# Patient Record
Sex: FEMALE | Race: WHITE | NOT HISPANIC OR LATINO | Employment: FULL TIME | URBAN - METROPOLITAN AREA
[De-identification: names, ages, dates, MRNs, and addresses within clinical notes are randomized per-mention and may not be internally consistent; named-entity substitution may affect disease eponyms.]

---

## 2021-01-07 ENCOUNTER — OFFICE VISIT (OUTPATIENT)
Dept: BARIATRICS | Facility: CLINIC | Age: 51
End: 2021-01-07
Payer: COMMERCIAL

## 2021-01-07 VITALS
SYSTOLIC BLOOD PRESSURE: 118 MMHG | HEART RATE: 88 BPM | DIASTOLIC BLOOD PRESSURE: 70 MMHG | WEIGHT: 188.1 LBS | TEMPERATURE: 98.6 F | BODY MASS INDEX: 29.52 KG/M2 | HEIGHT: 67 IN

## 2021-01-07 DIAGNOSIS — F41.8 DEPRESSION WITH ANXIETY: ICD-10-CM

## 2021-01-07 DIAGNOSIS — E66.3 OVERWEIGHT: Primary | ICD-10-CM

## 2021-01-07 DIAGNOSIS — E78.5 HYPERLIPIDEMIA: ICD-10-CM

## 2021-01-07 DIAGNOSIS — K21.9 GERD (GASTROESOPHAGEAL REFLUX DISEASE): ICD-10-CM

## 2021-01-07 PROCEDURE — 99244 OFF/OP CNSLTJ NEW/EST MOD 40: CPT | Performed by: INTERNAL MEDICINE

## 2021-01-07 RX ORDER — OMEPRAZOLE 40 MG/1
40 CAPSULE, DELAYED RELEASE ORAL 2 TIMES DAILY
COMMUNITY
Start: 2021-01-04

## 2021-01-07 RX ORDER — ATORVASTATIN CALCIUM 20 MG/1
20 TABLET, FILM COATED ORAL DAILY
COMMUNITY
Start: 2020-11-23 | End: 2021-11-23

## 2021-01-07 RX ORDER — TOPIRAMATE 50 MG/1
100 TABLET, FILM COATED ORAL DAILY
COMMUNITY
Start: 2020-11-06 | End: 2021-01-07

## 2021-01-07 RX ORDER — ALBUTEROL SULFATE 90 UG/1
1-2 AEROSOL, METERED RESPIRATORY (INHALATION)
COMMUNITY
Start: 2014-06-16

## 2021-01-07 RX ORDER — IBUPROFEN 800 MG/1
TABLET ORAL
COMMUNITY

## 2021-01-07 RX ORDER — BUSPIRONE HYDROCHLORIDE 10 MG/1
10 TABLET ORAL 2 TIMES DAILY
COMMUNITY
Start: 2020-11-19 | End: 2021-01-07

## 2021-01-07 NOTE — PROGRESS NOTES
Assessment/Plan:  Zenaida Robin was seen today for consult  Diagnoses and all orders for this visit:      Hyperlipidemia  Can improve with weight loss  On lipitor    Depression with anxiety  Currently stable after going back on sertraline  Did not tolerate Wellbutrin due to increased anxiety    GERD (gastroesophageal reflux disease)  Controlled on Prilosec    Overweight  -     Basic metabolic panel; Future  -     Magnesium; Future  - Discussed options of HealthyCORE-Intensive Lifestyle Intervention Program, Very Low Calorie Diet-VLCD and Conservative Program and the role of weight loss medications  - Explained the importance of making lifestyle changes first before starting any anti-obesity medications  Patient should demonstrate lifestyle changes first before anti-obesity medication can be initiated  - Patient is interested in pursuing Very Low Calorie Diet-VLCD then Healthy Core/BH  - Initial weight loss goal of 5-10% weight loss for improved health  - Screening labs    Goals:  Do not skip any meals! Food log (ie ) www myfitnesspal com,sparkpeople  com,loseit com,calorieking  com,etc  baritastic (use smartphone [employer] for recipes)  No sugary beverages  At least 64oz of water daily  Increase physical activity by 10 minutes daily  Gradually increase physical activity to a goal of 5 days per week for 30 minutes of MODERATE intensity PLUS 2 days per week of FULL BODY resistance training (use smartphone apps FitON, Home Workout, etc )  Goal protein 90g per day  Goal carbohydrates 90g per day   [id number]    45 minute visit, >50% face-to-face time spent counseling patient on surgical and nonsurgical interventions for the treatment of excess weight  Discussed the advantages and long-term outcomes with regards to bariatric surgery    Discussed in detail nonsurgical options including intensive lifestyle intervention program, very low-calorie diet program and conservative program   Discussed the role of weight loss medications  Counseled patient on diet behavior and exercise modification for weight loss  Follow up in approximately 2 weeks with Non-Surgical Dietician  Subjective:   Chief Complaint   Patient presents with    Consult     Patient is here for initial MWM consult with Dr Abiel Damian  BMI 29 46  Negative stop bang (0/8)  Patient ID: Babar Warren  is a 53 y o  female with excess weight/obesity here to pursue weight management  Past Medical History:   Diagnosis Date    Acid reflux     Anxiety     Depression     Hyperlipidemia     Left bundle branch block     Overweight (BMI 25 0-29  9)      Past Surgical History:   Procedure Laterality Date    CHOLECYSTECTOMY      TUBAL LIGATION         HPI:  Wt Readings from Last 20 Encounters:   01/07/21 85 3 kg (188 lb 1 6 oz)   06/16/14 78 9 kg (174 lb)       Obesity/Excess Weight: 30 lb  Severity: overweight  Onset: +40 lbs over last 10 years   When quit smoking and went through menopause   Modifiers: Diet and Exercise  Contributing factors: Poor Food Choices and Insufficient Physical Activity  Associated symptoms: depression  Goal weight: 140  Saw MWM at [facility]; did not like the care she got there  Was taken off sertraline and started on Wellbutrin and topamax-  Had side effects with tingling/numbness, and anxiety got worse  Back on sertraline and feeling better    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 for 3 weeks but gained weight 3 lbs; Goal 23 points but not reaching it  For fruit just eating blueberries/blackberries  Very frustrated that she cannot lose weight  Has been taking Nyquil due to cold sx's for the past week    Gets nausea depending on food   Has GI appt coming up  Doesn't want to consider weight loss medications at this point    B- hard boiled egg, blackberries  S- built bar   L- spinach soup  S- blackberries  D- protein, veggies  S-    Hydration: >64 oz water  Alcohol: no  Smoking: no  Exercise: 3 days a week fitness training   Sleep: 7 hours    The following portions of the patient's history were reviewed and updated as appropriate: allergies, current medications, past family history, past medical history, past social history, past surgical history, and problem list     Review of Systems   Constitutional: Negative for appetite change, chills and fever  HENT: Positive for congestion and rhinorrhea  Negative for sore throat  Respiratory: Negative for cough, chest tightness and shortness of breath  Cardiovascular: Negative for chest pain and leg swelling  Gastrointestinal: Negative for abdominal pain, constipation, diarrhea, nausea and vomiting  Endocrine: Negative for cold intolerance and heat intolerance  Genitourinary: Negative for difficulty urinating  Musculoskeletal: Negative for arthralgias  Skin: Negative for color change  Neurological: Negative for dizziness, numbness and headaches  Psychiatric/Behavioral: Negative for sleep disturbance  The patient is not nervous/anxious  All other systems reviewed and are negative  Objective:  /70 (BP Location: Right arm, Patient Position: Sitting, Cuff Size: Large)   Pulse 88   Temp 98 6 °F (37 °C) (Temporal)   Ht 5' 7" (1 702 m)   Wt 85 3 kg (188 lb 1 6 oz)   BMI 29 46 kg/m²   Constitutional: Well-developed, well-nourished and obese Body mass index is 29 46 kg/m²  Luke Rodriguez HEENT: No conjunctival pallor or jaundice  Pulmonary: No increased work of breathing or signs of respiratory distress  Clear to auscultation  CV: Normal rate and rhythm, S1 and S2, without murmurs  GI: Obese  Normal bowel sounds  Soft and nontender  MSK: No edema   Neuro: Oriented to person, place and time  Normal Speech  Normal gait  Psych: Normal affect and mood       Labs and Imaging  Lab Results   Component Value Date    WBC 7 12 05/21/2014    HGB 13 8 05/21/2014    HCT 42 4 05/21/2014    MCV 91 05/21/2014     05/21/2014     Lab Results   Component Value Date     (L) 05/21/2014    K 3 6 05/21/2014    CL 99 (L) 05/21/2014    CO2 26 05/21/2014    ANIONGAP 10 05/21/2014    BUN 19 05/21/2014    CREATININE 0 65 05/21/2014    GLUF 88 07/31/2014    CALCIUM 9 5 05/21/2014    AST 12 05/21/2014    ALT 21 05/21/2014    ALKPHOS 75 05/21/2014    PROT 8 7 (H) 05/21/2014    BILITOT 0 33 05/21/2014     No results found for: HGBA1C  Lab Results   Component Value Date    LDK5KCKDFEIY 2 077 05/21/2014     No results found for: CHOLESTEROL  Lab Results   Component Value Date    HDL 44 07/31/2014     Lab Results   Component Value Date    TRIG 113 07/31/2014     No results found for: LDLDIRECT        Colonoscopy-Not applicable

## 2021-01-11 ENCOUNTER — OFFICE VISIT (OUTPATIENT)
Dept: BARIATRICS | Facility: CLINIC | Age: 51
End: 2021-01-11

## 2021-01-11 VITALS — WEIGHT: 185.6 LBS | TEMPERATURE: 97.5 F | BODY MASS INDEX: 29.13 KG/M2 | HEIGHT: 67 IN

## 2021-01-11 DIAGNOSIS — R63.5 ABNORMAL WEIGHT GAIN: ICD-10-CM

## 2021-01-11 PROCEDURE — RECHECK

## 2021-01-11 PROCEDURE — VLCD

## 2021-01-13 ENCOUNTER — TELEPHONE (OUTPATIENT)
Dept: BARIATRICS | Facility: CLINIC | Age: 51
End: 2021-01-13

## 2021-01-14 ENCOUNTER — TELEPHONE (OUTPATIENT)
Dept: BARIATRICS | Facility: CLINIC | Age: 51
End: 2021-01-14

## 2021-01-14 NOTE — TELEPHONE ENCOUNTER
Called pt to check after she started VLCD  She reports that she is doing well and is having no issues so far  She does have to have a stomach emptying test done next Monday    She was concerned because she needs to eat eggs/toast   She was also told that she could have an Ensure if she did not want the

## 2021-01-25 ENCOUNTER — OFFICE VISIT (OUTPATIENT)
Dept: BARIATRICS | Facility: CLINIC | Age: 51
End: 2021-01-25

## 2021-01-25 VITALS — BODY MASS INDEX: 27.97 KG/M2 | TEMPERATURE: 98 F | HEIGHT: 67 IN | WEIGHT: 178.2 LBS

## 2021-01-25 DIAGNOSIS — R63.5 ABNORMAL WEIGHT GAIN: Primary | ICD-10-CM

## 2021-01-25 PROCEDURE — RECHECK

## 2021-01-25 PROCEDURE — VLCD

## 2021-01-25 NOTE — PROGRESS NOTES
Weight Management Medical Nutrition Assessment  Em Banerjee was here today for her 2 wk VLCD follow-up visit  Today she weighs 178 2 lbs giving her a loss of 7 4 lbs in the last 2 weeks  She reports she is tolerating product with out difficultly and is no longer hungry now that she is in ketosis  She did state that she has been "emotional over not eating real food "  We discussed having a visit with our Doctor Ollie Dumont  She is interested and I provided her with the questionnaire to fill out  Questions asked and answered  She plans to continue at this time  Labs ordered  Patient seen by Medical Provider in past 6 months:  yes ( she saw Dr Sandy Arriaga for her initial visit )  Requested to schedule appointment with Medical Provider: No      Anthropometric Measurements  Start Weight (#): 185 6 lbs  Current Weight (#): 178 2 lbs  TBW % Change from start weight:   Ideal Body Weight (#):135 lbs  Goal Weight (#): 150    Weight Loss History  Previous weight loss attempts: Commercial Programs ([employer], [employer], etc )  Counseling with  MD    Food and Nutrition Related History  Wake up: 5:30 am   Bed Time: 11 pm    Food Recall  Breakfast:shake   Snack:  Lunch: shake/pudding  Snack: bar  Dinner: shake  Snack:       Beverages: water  Volume of beverage intake: 80 +     Weekends: Same  Cravings: sweets  Trouble area of day: after dinner/early evening  Frequency of Eating out: irregularly  Food restrictions:none  Cooking: self   Food Shopping: self    Physical Activity Intake  Activity:Walking and Aerobics  Frequency:occasionally  Physical limitations/barriers to exercise: none    Estimated Needs  Energy    [employer] Needs: BMR : 2359   1-2# loss weekly sedentary:  759 - 1259         1-2# loss weekly lightly active: 1016 - 1516   Maintenance calories for sedentary activity level: 1759  Protein: 74 - 92     (1 2-1 5g/kg IBW)  Fluid: 72   (35mL/kg IBW)    Nutrition Diagnosis  Yes;     Overweight/obesity  related to Excess energy intake as evidenced by  BMI more than normative standard for age and sex (overweight 25-29  9)       Nutrition Intervention    Nutrition Prescription  Calories:760   Protein: 96  Fluid: 80    Meal Plan (Jaime/Pro/Carb)  Breakfast:  200/27/10   Meal replacement  Snack:  Lunch: 200/27/10   Meal replacement  Snack:  Dinner: 200/27/10   Meal replacement  Snack: 160/15/13  Bar    Nutrition Education:    Calorie controlled menu  Lean protein food choices  Healthy snack options  Food journaling tips      Nutrition Counseling:  Strategies: meal planning, portion sizes, healthy snack choices, hydration, fiber intake, protein intake, exercise, food journal      Monitoring and Evaluation:  Evaluation criteria:  Energy Intake  Meet protein needs  Maintain adequate hydration  Monitor weekly weight  Meal planning/preparation  Food journal   Decreased portions at mealtimes and snacks  Physical activity     Barriers to learning:none  Readiness to change: Action:  (Changing behavior)  Comprehension: good  Expected Compliance: good

## 2021-02-01 ENCOUNTER — TELEPHONE (OUTPATIENT)
Dept: BARIATRICS | Facility: CLINIC | Age: 51
End: 2021-02-01

## 2021-02-01 NOTE — TELEPHONE ENCOUNTER
Lyric emailed to have me call her back - she has a VLCD questions  Left her a message  Waiting for her to return my call

## 2021-02-03 ENCOUNTER — TELEPHONE (OUTPATIENT)
Dept: BARIATRICS | Facility: CLINIC | Age: 51
End: 2021-02-03

## 2021-02-03 NOTE — TELEPHONE ENCOUNTER
Returned patient's call - she had left me a message this morning  She had questions about adding exercise into her plan  Reviewed the guidelines and reiterated the importance of adding a snack back in  Also discussed the importance of meeting her hydration goals  Questions asked and answered

## 2021-02-08 ENCOUNTER — OFFICE VISIT (OUTPATIENT)
Dept: BARIATRICS | Facility: CLINIC | Age: 51
End: 2021-02-08

## 2021-02-08 VITALS — HEIGHT: 67 IN | BODY MASS INDEX: 27.5 KG/M2 | WEIGHT: 175.2 LBS

## 2021-02-08 DIAGNOSIS — R63.5 ABNORMAL WEIGHT GAIN: ICD-10-CM

## 2021-02-08 PROCEDURE — VLCD

## 2021-02-08 PROCEDURE — RECHECK

## 2021-02-08 NOTE — PROGRESS NOTES
Weight Management Medical Nutrition Assessment  May Metzger was here today for her 4 wk VLCD follow-up visit  Today she weighs 173 2 lbs giving her a loss of 5 lbs in the last 2 weeks  She reports she is tolerating product with out difficultly and is no longer hungry now that she is in ketosis  She reports that she no longer has "emotional issues because of not eating real food" and is no longer interested in speaking with our   She reports that she has been exercising and was feeling tired and a little light headed  Reviewed the guidelines for exercise while on VLCD and reviewed the snacks to add with her  Questions asked and answered  She will be continuing at this time  Patient seen by Medical Provider in past 6 months:  yes ( she saw Dr Jennifer Lobo for her initial visit )  Requested to schedule appointment with Medical Provider: No        Anthropometric Measurements  Start Weight (#): 185 6 lbs  Current Weight (#): 173 2 lbs  TBW % Change from start weight:   Ideal Body Weight (#):135 lbs  Goal Weight (#): 150     Weight Loss History  Previous weight loss attempts: Commercial Programs ([employer], [employer], etc )  Counseling with  MD     Food and Nutrition Related History  Wake up: 5:30 am         Bed Time: 11 pm     Food Recall  Breakfast:shake            Snack:  Lunch: shake/pudding  Snack: bar  Dinner: shake  Snack:         Beverages: water  Volume of beverage intake: 80 +      Weekends: Same  Cravings: sweets  Trouble area of day: after dinner/early evening      Frequency of Eating out: irregularly  Food restrictions:none  Cooking: self   Food Shopping: self     Physical Activity Intake  Activity:Walking and Aerobics  Frequency:occasionally  Physical limitations/barriers to exercise: none     Estimated Needs  Energy     [employer] Energy Needs:  BMR : 6807   1-2# loss weekly sedentary:  750 - 1259         1-2# loss weekly lightly active: 1880 - 1516   Maintenance calories for sedentary activity level: 1759  Protein: 74 - 92     (1 2-1 5g/kg IBW)  Fluid: 72   (35mL/kg IBW)     Nutrition Diagnosis  Yes; Overweight/obesity  related to Excess energy intake as evidenced by  BMI more than normative standard for age and sex (overweight 25-29  9)     Nutrition Intervention     Nutrition Prescription  Calories:760   Protein: 96  Fluid: 80     Meal Plan (Jaime/Pro/Carb)  Breakfast:  200/27/10   Meal replacement  Snack:  Lunch: 200/27/10   Meal replacement  Snack:  Dinner: 200/27/10   Meal replacement  Snack: 160/15/13  Bar     Nutrition Education:    Calorie controlled menu  Lean protein food choices  Healthy snack options  Food journaling tips        Nutrition Counseling:  Strategies: meal planning, portion sizes, healthy snack choices, hydration, fiber intake, protein intake, exercise, food journal        Monitoring and Evaluation:  Evaluation criteria:  Energy Intake  Meet protein needs  Maintain adequate hydration  Monitor weekly weight  Meal planning/preparation  Food journal   Decreased portions at mealtimes and snacks  Physical activity      Barriers to learning:none  Readiness to change: Action:  (Changing behavior)  Comprehension: good  Expected Compliance: good

## 2021-02-22 ENCOUNTER — OFFICE VISIT (OUTPATIENT)
Dept: BARIATRICS | Facility: CLINIC | Age: 51
End: 2021-02-22

## 2021-02-22 VITALS — HEIGHT: 67 IN | WEIGHT: 170 LBS | BODY MASS INDEX: 26.68 KG/M2

## 2021-02-22 DIAGNOSIS — R63.5 ABNORMAL WEIGHT GAIN: Primary | ICD-10-CM

## 2021-02-22 PROCEDURE — VLCD

## 2021-02-22 PROCEDURE — RECHECK

## 2021-02-22 NOTE — PROGRESS NOTES
Weight Management Medical Nutrition Assessment  Fabio Odonnell was here today for her 6 wk VLCD follow-up visit   Today she weighs 170 0 lbs giving her a loss of 3 2 lbs in the last 2 weeks   She reports she is tolerating product with out difficultly and has no constipation  She reports again that she has been exercising and was feeling tired and a little light headed  Reiterated the guidelines for exercise while on VLCD and reviewed the snacks to add with her  Questions asked and answered  Labs ordered  She will be continuing at this time       Patient seen by Medical Provider in past 6 months:  yes ( she saw Dr Shauna Sinclair for her initial visit )  Requested to schedule appointment with Medical Provider: No        Anthropometric Measurements  Start Weight (#): 185 6 lbs  Current Weight (#): 170 0 lbs  TBW % Change from start weight: 8%  Ideal Body Weight (#):135 lbs  Goal Weight (#): 150     Weight Loss History  Previous weight loss attempts: Commercial Programs (Weight Watchers, [employer]ena Rival, etc )  Counseling with  MD     Food and Nutrition Related History  Wake up: 5:30 am         Bed Time: 11 pm     Food Recall  Breakfast:shake            Snack:  Lunch: shake/pudding  Snack: bar  Dinner: shake  Snack:         Beverages: water  Volume of beverage intake: 80 +      Weekends: Same  Cravings: sweets  Trouble area of day: after dinner/early evening      Frequency of Eating out: irregularly  Food restrictions:none  Cooking: self   Food Shopping: self     Physical Activity Intake  Activity:Walking and Aerobics  Frequency:occasionally  Physical limitations/barriers to exercise: none     Estimated Needs  Energy     [employer] Energy Needs:  BMR : 6231   3-7# loss weekly sedentary:  714 - 4500         4-3# loss weekly lightly active: 1016 - 1516   Maintenance calories for sedentary activity level: 1759  Protein: 74 - 92     (1 2-1 5g/kg IBW)  Fluid: 72   (35mL/kg IBW)     Nutrition Diagnosis  Yes;    Overweight/obesity  related to Excess energy intake as evidenced by  BMI more than normative standard for age and sex (overweight 25-29  9)     Nutrition Intervention     Nutrition Prescription  Calories:760   Protein: 96  Fluid: 80     Meal Plan (Jaime/Pro/Carb)  Breakfast:  200/27/10   Meal replacement  Snack:  Lunch: 200/27/10   Meal replacement  Snack:  Dinner: 200/27/10   Meal replacement  Snack: 160/15/13  Bar     Nutrition Education:    Calorie controlled menu  Lean protein food choices  Healthy snack options  Food journaling tips        Nutrition Counseling:  Strategies: meal planning, portion sizes, healthy snack choices, hydration, fiber intake, protein intake, exercise, food journal        Monitoring and Evaluation:  Evaluation criteria:  Energy Intake  Meet protein needs  Maintain adequate hydration  Monitor weekly weight  Meal planning/preparation  Food journal   Decreased portions at mealtimes and snacks  Physical activity      Barriers to learning:none  Readiness to change: Action:  (Changing behavior)  Comprehension: good  Expected Compliance: good

## 2021-03-08 ENCOUNTER — OFFICE VISIT (OUTPATIENT)
Dept: BARIATRICS | Facility: CLINIC | Age: 51
End: 2021-03-08

## 2021-03-08 DIAGNOSIS — R63.5 ABNORMAL WEIGHT GAIN: ICD-10-CM

## 2021-03-08 PROCEDURE — VLCD

## 2021-03-08 PROCEDURE — RECHECK

## 2021-03-08 NOTE — PROGRESS NOTES
Weight Management Medical Nutrition Assessment  Nissa Hernandez was here today for her 8 wk VLCD follow-up visit   Today she weighs 165 4 lbs giving her a loss of 4 6 lbs in the last 2 weeks and a total of 22 7 since started the program   She continues to tolerate product with out difficultly and has no constipation   She continues to exercise, but reports that she is adding the appropriate snacks in per our previous conversation  She will be continuing at this time       Patient seen by Medical Provider in past 6 months:  yes ( she saw Dr Dennise Reyes for her initial visit )  Requested to schedule appointment with Medical Provider: No        Anthropometric Measurements  Start Weight (#): 188 1 lbs  Current Weight (#): 165 4 lbs  TBW % Change from start weight: 12 %  Ideal Body Weight (#):135 lbs  Goal Weight (#): 150     Weight Loss History  Previous weight loss attempts: Commercial Programs (Weight Watchers, [employer], etc )  Counseling with  MD     Food and Nutrition Related History  Wake up: 5:30 am         Bed Time: 11 pm     Food Recall  Breakfast:shake            Snack:  Lunch: shake/pudding  Snack: bar  Dinner: shake  Snack:         Beverages: water  Volume of beverage intake: 80 +      Weekends: Same  Cravings: sweets  Trouble area of day: after dinner/early evening      Frequency of Eating out: irregularly  Food restrictions:none  Cooking: self   Food Shopping: self     Physical Activity Intake  Activity:Walking and Aerobics  Frequency:occasionally  Physical limitations/barriers to exercise: none     Estimated Needs  Energy     [employer] [place] Energy Needs:  BMR : 8811   0# loss weekly sedentary:  5686         9-6# loss weekly lightly active: 936 - 1436   Maintenance calories for sedentary activity level: 1689  Protein: 74 - 92     (1 2-1 5g/kg IBW)  Fluid: 72   (35mL/kg IBW)     Nutrition Diagnosis  Yes;    Overweight/obesity  related to Excess energy intake as evidenced by  BMI more than normative standard for age and sex (overweight 25-29  9)     Nutrition Intervention     Nutrition Prescription  Calories:760   Protein: 96  Fluid: 80     Meal Plan (Jaime/Pro/Carb)  Breakfast:  200/27/10   Meal replacement  Snack:  Lunch: 200/27/10   Meal replacement  Snack:  Dinner: 200/27/10   Meal replacement  Snack: 160/15/13  Bar     Nutrition Education:    Calorie controlled menu  Lean protein food choices  Healthy snack options  Food journaling tips        Nutrition Counseling:  Strategies: meal planning, portion sizes, healthy snack choices, hydration, fiber intake, protein intake, exercise, food journal        Monitoring and Evaluation:  Evaluation criteria:  Energy Intake  Meet protein needs  Maintain adequate hydration  Monitor weekly weight  Meal planning/preparation  Food journal   Decreased portions at mealtimes and snacks  Physical activity      Barriers to learning:none  Readiness to change: Action:  (Changing behavior)  Comprehension: good  Expected Compliance: good

## 2021-03-16 ENCOUNTER — TELEPHONE (OUTPATIENT)
Dept: BARIATRICS | Facility: CLINIC | Age: 51
End: 2021-03-16

## 2021-03-16 NOTE — TELEPHONE ENCOUNTER
Marleny Hamilton reached out to me via email with questions about why her weight loss is stalling while on VLCD  She has been doing some strenuous exercise while following VLCD  Discussed with Dr Emeka Gregorio that she should do less strenuous exercise like walking, or transition to a partial meal plan and then continue to exercise however she wants  I replied to her through e-mail

## 2021-03-22 ENCOUNTER — OFFICE VISIT (OUTPATIENT)
Dept: BARIATRICS | Facility: CLINIC | Age: 51
End: 2021-03-22

## 2021-03-22 VITALS — BODY MASS INDEX: 25.65 KG/M2 | HEIGHT: 67 IN | WEIGHT: 163.4 LBS

## 2021-03-22 DIAGNOSIS — R63.5 ABNORMAL WEIGHT GAIN: ICD-10-CM

## 2021-03-22 PROCEDURE — VLCD

## 2021-03-22 PROCEDURE — RECHECK

## 2021-04-05 ENCOUNTER — OFFICE VISIT (OUTPATIENT)
Dept: BARIATRICS | Facility: CLINIC | Age: 51
End: 2021-04-05

## 2021-04-05 VITALS — HEIGHT: 67 IN | WEIGHT: 159 LBS | BODY MASS INDEX: 24.96 KG/M2

## 2021-04-05 DIAGNOSIS — R63.5 ABNORMAL WEIGHT GAIN: ICD-10-CM

## 2021-04-05 PROCEDURE — RECHECK

## 2021-04-05 PROCEDURE — VLCD

## 2021-04-05 NOTE — PROGRESS NOTES
Weight Management Medical Nutrition Assessment  Silvia Palacios was here today for her 12 wk VLCD follow-up visit   Today she weighs 162 4 lbs giving her a loss of 3 4 lbs in the last 2 weeks and a total of 29 1 lbs since starting the program   She continues to tolerate product with out difficultly and has no constipation  Moving forward, she plans to continue with meal planning visits, and using a partial meal plan  She plans to have 2 meal replacements, a bar, a low carb snack, and 1 sensible low carb meal  Reviewed plan  Questions and answered  Patient seen by Medical Provider in past 6 months:  yes ( she saw Dr Melinda Nieves for her initial visit )  Requested to schedule appointment with Medical Provider: No        Anthropometric Measurements  Start Weight (#): 188 1 lbs  Current Weight (#): 159 lbs  TBW % Change from start weight: 15 %  Ideal Body Weight (#):135 lbs  Goal Weight (#): 150     Weight Loss History  Previous weight loss attempts: Commercial Programs (Weight Watchers, [employer], etc )  Counseling with  MD     Food and Nutrition Related History  Wake up: 5:30 am         Bed Time: 11 pm     Food Recall  Breakfast:shake            Snack:  Lunch: shake/pudding  Snack: bar  Dinner: shake  Snack:         Beverages: water  Volume of beverage intake: 80 +      Weekends: Same  Cravings: sweets  Trouble area of day: after dinner/early evening      Frequency of Eating out: irregularly  Food restrictions:none  Cooking: self   Food Shopping: self     Physical Activity Intake  Activity:Walking and Aerobics  Frequency:occasionally  Physical limitations/barriers to exercise: none     Estimated Needs  Energy     [employer] Energy Needs:  BMR : 9166   3# loss weekly sedentary:  1155       1-2# loss weekly lightly active: 923 - 1423  Maintenance calories for sedentary activity level: 1655  Protein: 74 - 92     (1 2-1 5g/kg IBW)  Fluid: 72   (35mL/kg IBW)     Nutrition Diagnosis  Yes;    Overweight/obesity  related to Excess energy intake as evidenced by  BMI more than normative standard for age and sex (overweight 25-29  9)     Nutrition Intervention     Nutrition Prescription  Calories:1000  Protein: 74 - 80  Fluid: 72     Meal Plan (Jaime/Pro/Carb)  Breakfast:  200/27/10   Meal replacement  Snack:  Lunch: 200/27/10   Meal replacement  Snack:100/10/-    low carb snack  Dinner: 400/35/10 ( lean protein, non starchy veg)  Snack: 160/15/13  Bar     Nutrition Education:    Calorie controlled menu  Lean protein food choices  Healthy snack options  Food journaling tips        Nutrition Counseling:  Strategies: meal planning, portion sizes, healthy snack choices, hydration, fiber intake, protein intake, exercise, food journal        Monitoring and Evaluation:  Evaluation criteria:  Energy Intake  Meet protein needs  Maintain adequate hydration  Monitor weekly weight  Meal planning/preparation  Food journal   Decreased portions at mealtimes and snacks  Physical activity      Barriers to learning:none  Readiness to change: Action:  (Changing behavior)  Comprehension: good  Expected Compliance: good

## 2021-05-17 ENCOUNTER — OFFICE VISIT (OUTPATIENT)
Dept: BARIATRICS | Facility: CLINIC | Age: 51
End: 2021-05-17

## 2021-05-17 VITALS — BODY MASS INDEX: 23.73 KG/M2 | WEIGHT: 151.2 LBS | HEIGHT: 67 IN

## 2021-05-17 DIAGNOSIS — R63.5 ABNORMAL WEIGHT GAIN: ICD-10-CM

## 2021-05-17 PROCEDURE — RECHECK

## 2021-05-17 PROCEDURE — DB3PK

## 2021-05-17 NOTE — PROGRESS NOTES
Weight Management Medical Nutrition Assessment  Hamzah Pichardo was here today for 1/3 RD visitst   Today she weighs 151 2 lbs giving her a loss of 7 8 lbs in the last 4 weeks  She has been following a partial meal plan have 2 meal replacements, a bar, a low carb snack and a low carb dinner  She has been food logging and mindful eating  She also exercises 4 - 5 time per week       Patient seen by Medical Provider in past 6 months:  yes ( she saw Dr Winnie Martinez for her initial visit )  Requested to schedule appointment with Medical Provider: No        Anthropometric Measurements  Start Weight (#): 188 1 lbs  Current Weight (#): 151 2 lbs  TBW % Change from start weight: 20%  Ideal Body Weight (#):135 lbs  Goal Weight (#): 150     Weight Loss History  Previous weight loss attempts: Commercial Programs (Weight Watchers, [employer], etc )  Counseling with  MD     Food and Nutrition Related History  Wake up: 5:30 am         Bed Time: 11 pm     Food Recall  Breakfast:shake            Snack:  Lunch: shake/pudding  Snack: string cheese  Dinner: fish, brussel sprouts  Snack: bar        Beverages: water  Volume of beverage intake: 80 +      Weekends: Same  Cravings: sweets  Trouble area of day: after dinner/early evening      Frequency of Eating out: irregularly  Food restrictions:none  Cooking: self   Food Shopping: self     Physical Activity Intake  Activity:Walking and Aerobics  Frequency:4-5 times per week for 45 minutes  Physical limitations/barriers to exercise: none     Estimated Needs  Energy     [employer] Energy Needs: BMR : 8597   1# loss weekly sedentary:  1112     1-2# loss weekly lightly active: 923 - 1423  Maintenance calories for sedentary activity level: 1612  Protein: 74 - 92     (1 2-1 5g/kg IBW)  Fluid: 72   (35mL/kg IBW)     Nutrition Diagnosis  Yes;    Overweight/obesity  related to Excess energy intake as evidenced by  BMI more than normative standard for age and sex (overweight 25-29  9)     Nutrition Intervention     Nutrition Prescription  Calories:1000  Protein: 74 - 80  Fluid: 72     Meal Plan (Jaime/Pro/Carb)  Breakfast:  200/27/10   Meal replacement  Snack:  Lunch: 200/27/10   Meal replacement  Snack:100/10/-    low carb snack  Dinner: 400/35/10 ( lean protein, non starchy veg)  Snack: 160/15/13  Bar     Nutrition Education:    Calorie controlled menu  Lean protein food choices  Healthy snack options  Food journaling tips        Nutrition Counseling:  Strategies: meal planning, portion sizes, healthy snack choices, hydration, fiber intake, protein intake, exercise, food journal        Monitoring and Evaluation:  Evaluation criteria:  Energy Intake  Meet protein needs  Maintain adequate hydration  Monitor weekly weight  Meal planning/preparation  Food journal   Decreased portions at mealtimes and snacks  Physical activity      Barriers to learning:none  Readiness to change: Action:  (Changing behavior)  Comprehension: good  Expected Compliance: good

## 2021-08-30 ENCOUNTER — OFFICE VISIT (OUTPATIENT)
Dept: BARIATRICS | Facility: CLINIC | Age: 51
End: 2021-08-30

## 2021-08-30 DIAGNOSIS — R63.5 ABNORMAL WEIGHT GAIN: Primary | ICD-10-CM

## 2021-08-30 PROCEDURE — RECHECK

## 2021-08-30 NOTE — PROGRESS NOTES
Weight Management Medical Nutrition Assessment  Mony Pineda was here today for 2/3 RD visitst   Today she weighs 143 4 lbs giving her a loss of 7 8 lbs in the last 10 weeks  She continues to follow a partial meal plan have 2 meal replacements, a bar, a low carb snack and a low carb dinner  She has been food logging and mindful eating  She has been consistently working out with a  and usually lifts 3 - 4 times a week, and cardio 3 times  Levi Rojas has exceeded her goal weight of 145, and now plans to maintain her weight  Reviewed calories for maintenance  Questions asked and answered  Patient seen by Medical Provider in past 6 months:  yes ( she saw Dr Elida Rosenberg for her initial visit )  Requested to schedule appointment with Medical Provider: No        Anthropometric Measurements  Start Weight (#): 188 1 lbs  Current Weight (#): 143 4 lbs  TBW % Change from start weight: 24%  Ideal Body Weight (#):135 lbs  Goal Weight (#): 145 lbs     Weight Loss History  Previous weight loss attempts: Commercial Programs (Weight Watchers, Susana Nair, etc )  Counseling with  MD     Food and Nutrition Related History  Wake up: 5:30 am         Bed Time: 11 pm     Food Recall  Breakfast:shake            Snack:  Lunch: shake/pudding  Snack: string cheese  Dinner: vegetables, chicken or salmon  Snack: bar        Beverages: water  Volume of beverage intake: 80 +      Weekends: Same  Cravings: sweets  Trouble area of day: after dinner/early evening      Frequency of Eating out: irregularly  Food restrictions:none  Cooking: self   Food Shopping: self     Physical Activity Intake  Activity:Walking and Aerobics and strength  Frequency:4-5 times per week for 45 minutes  Physical limitations/barriers to exercise: none     Estimated Needs  Energy     [employer] Energy Needs:  BMR : 5583   0# loss weekly sedentary:  1064     1-2# loss weekly lightly active: 923 - 1423  Maintenance calories for sedentary activity LBROZ: 4904  Protein: 74 - 92     (1 2-1 5g/kg IBW)  Fluid: 72   (35mL/kg IBW)     Nutrition Diagnosis  Yes;    Overweight/obesity  related to Excess energy intake as evidenced by  BMI more than normative standard for age and sex (overweight 25-29  9)     Nutrition Intervention     Nutrition Prescription  Calories:1000  Protein: 74 - 80  Fluid: 72     Meal Plan (Jaime/Pro/Carb)  Breakfast:  200/27/10   Meal replacement  Snack:  Lunch: 200/27/10   Meal replacement  Snack:100/10/-    low carb snack  Dinner: 400/35/10 ( lean protein, non starchy veg)  Snack: 160/15/13  Bar     Nutrition Education:    Calorie controlled menu  Lean protein food choices  Healthy snack options  Food journaling tips        Nutrition Counseling:  Strategies: meal planning, portion sizes, healthy snack choices, hydration, fiber intake, protein intake, exercise, food journal        Monitoring and Evaluation:  Evaluation criteria:  Energy Intake  Meet protein needs  Maintain adequate hydration  Monitor weekly weight  Meal planning/preparation  Food journal   Decreased portions at mealtimes and snacks  Physical activity      Barriers to learning:none  Readiness to change: Action:  (Changing behavior)  Comprehension: good  Expected Compliance: good

## 2021-10-25 ENCOUNTER — TELEPHONE (OUTPATIENT)
Dept: BARIATRICS | Facility: CLINIC | Age: 51
End: 2021-10-25

## 2021-11-29 ENCOUNTER — OFFICE VISIT (OUTPATIENT)
Dept: BARIATRICS | Facility: CLINIC | Age: 51
End: 2021-11-29

## 2021-11-29 VITALS — BODY MASS INDEX: 22.41 KG/M2 | WEIGHT: 142.8 LBS | HEIGHT: 67 IN

## 2021-11-29 DIAGNOSIS — R63.5 ABNORMAL WEIGHT GAIN: Primary | ICD-10-CM

## 2021-11-29 PROCEDURE — RECHECK

## 2022-05-04 ENCOUNTER — OFFICE VISIT (OUTPATIENT)
Dept: BARIATRICS | Facility: CLINIC | Age: 52
End: 2022-05-04

## 2022-05-04 VITALS — WEIGHT: 156 LBS | BODY MASS INDEX: 24.48 KG/M2 | HEIGHT: 67 IN

## 2022-05-04 DIAGNOSIS — R63.5 ABNORMAL WEIGHT GAIN: ICD-10-CM

## 2022-05-04 PROCEDURE — RECHECK

## 2022-05-04 PROCEDURE — WMDI30

## 2022-05-04 NOTE — PROGRESS NOTES
Weight Management Medical Nutrition Assessment  Viraj Correia was here today for meal planning   Today she weighs 156 lbs lbs giving her a gain of 13 2 lbs since her last visit in November  She continues to follow a partial meal plan have 2 meal replacements, a bar, a low carb snack and a low carb dinner   She is not currently food logging and has not been weighing or measuring her food  She had elbow surgery 4 weeks ago, and has not been able to work since the surgery  We discussed and reviewed her meal plan, and I recommend htat she start measuring and logging her food  Patient seen by Medical Provider in past 6 months:  yes ( she saw Dr Amy Houser for her initial visit )  Requested to schedule appointment with Medical Provider: No        Anthropometric Measurements  Start Weight (#): 188 1 lbs  Current Weight (#): 156 lbs  TBW % Change from start weight:17%  Ideal Body Weight (#):135 lbs  Goal Weight (#): 145 lbs     Weight Loss History  Previous weight loss attempts: Commercial Programs (Weight Watchers, [employer], etc )  Counseling with  MD     Food and Nutrition Related History  Wake up: 5:30 am         Bed Time: 11 pm     Food Recall  Breakfast:shake            Snack:  Lunch: shake/pudding  Snack: string cheese  Dinner: vegetables, chicken or salmon  Snack: bar        Beverages: water  Volume of beverage intake: 80 +      Weekends: Same  Cravings: sweets  Trouble area of day: after dinner/early evening      Frequency of Eating out: irregularly  Food restrictions:none  Cooking: self   Food Shopping: self     Physical Activity Intake  Activity:Walking and Aerobics and strength  Frequency:4-5 times per week for 45 minutes  Physical limitations/barriers to exercise: none     Estimated Needs  Energy     [employer] Energy Needs:  BMR : 1514   5# loss weekly sedentary:  1064     1-2# loss weekly lightly active: 923 - 1423  Maintenance calories for sedentary activity HTOVT: 1335  Protein: 74 - 92     (1 2-1 5g/kg IBW)  Fluid: 72   (35mL/kg IBW)     Nutrition Diagnosis  Yes;    Overweight/obesity  related to Excess energy intake as evidenced by  BMI more than normative standard for age and sex (overweight 25-29  9)     Nutrition Intervention     Nutrition Prescription  Calories:1000  Protein: 74 - 80  Fluid: 72     Meal Plan (Jaime/Pro/Carb)  Breakfast:  200/27/10   Meal replacement  Snack:  Lunch: 200/27/10   Meal replacement  Snack:100/10/-    low carb snack  Dinner: 400/35/10 ( lean protein, non starchy veg)  Snack: 160/15/13  Bar     Nutrition Education:    Calorie controlled menu  Lean protein food choices  Healthy snack options  Food journaling tips        Nutrition Counseling:  Strategies: meal planning, portion sizes, healthy snack choices, hydration, fiber intake, protein intake, exercise, food journal        Monitoring and Evaluation:  Evaluation criteria:  Energy Intake  Meet protein needs  Maintain adequate hydration  Monitor weekly weight  Meal planning/preparation  Food journal   Decreased portions at mealtimes and snacks  Physical activity      Barriers to learning:none  Readiness to change: Action:  (Changing behavior)  Comprehension: good  Expected Compliance: good

## 2022-06-07 ENCOUNTER — TELEPHONE (OUTPATIENT)
Dept: BARIATRICS | Facility: CLINIC | Age: 52
End: 2022-06-07

## 2022-06-07 NOTE — TELEPHONE ENCOUNTER
Lm on pt's vm re missing her appt this morning and also to let her know that the product that she was missing from her order the other day (4 puddings), did come in and are ready for her

## 2023-01-23 ENCOUNTER — OFFICE VISIT (OUTPATIENT)
Dept: BARIATRICS | Facility: CLINIC | Age: 53
End: 2023-01-23

## 2023-01-23 VITALS — HEIGHT: 67 IN | WEIGHT: 160.4 LBS | BODY MASS INDEX: 25.18 KG/M2

## 2023-01-23 DIAGNOSIS — R63.5 ABNORMAL WEIGHT GAIN: ICD-10-CM

## 2023-01-23 NOTE — PROGRESS NOTES
Shake   Cheese fWeight Management Medical Nutrition Assessment  Kevin Campuzano was here today for meal planning   Today she weighs 156 lbs lbs giving her a gain of 13 2 lbs since her last visit in November  She continues to follow a partial meal plan have 2 meal replacements, a bar, a low carb snack and a low carb dinner   She is not currently food logging and has not been weighing or measuring her food  She had elbow surgery 4 weeks ago, and has not been able to work since the surgery  We discussed and reviewed her meal plan, and I recommend htat she start measuring and logging her food       Patient seen by Medical Provider in past 6 months:  yes ( she saw Dr Umang Serrano for her initial visit )  Requested to schedule appointment with Medical Provider: No        Anthropometric Measurements  Start Weight (#): 188 1 lbs  Current Weight (#): 156 lbs  TBW % Change from start weight:17%  Ideal Body Weight (#):135 lbs  Goal Weight (#): 145 lbs     Weight Loss History  Previous weight loss attempts: Commercial Programs (Weight Watchers, [employer] [facility], etc )  Counseling with  MD     Food and Nutrition Related History  Wake up: 5:30 am         Bed Time: 11 pm     Food Recall  Breakfast:shake            Snack:  Lunch: shake/pudding  Snack: string cheese  Dinner: vegetables, chicken or salmon  Snack: bar        Beverages: water  Volume of beverage intake: 80 +      Weekends: Same  Cravings: sweets  Trouble area of day: after dinner/early evening      Frequency of Eating out: irregularly  Food restrictions:none  Cooking: self   Food Shopping: self     Physical Activity Intake  Activity:Walking and Aerobics and strength  Frequency:4-5 times per week for 45 minutes  Physical limitations/barriers to exercise: none     Estimated Needs  Energy     [employer] Energy Needs:  BMR : 4333   8# loss weekly sedentary:  1064     1-2# loss weekly lightly active: 923 - 1423  Maintenance calories for sedentary activity NRJBU: 1390  Protein: 74 - 92     (1 2-1 5g/kg IBW)  Fluid: 72   (35mL/kg IBW)     Nutrition Diagnosis  Yes;    Overweight/obesity  related to Excess energy intake as evidenced by  BMI more than normative standard for age and sex (overweight 25-29  9)     Nutrition Intervention     Nutrition Prescription  Calories:1000  Protein: 74 - 80  Fluid: 72     Meal Plan (Jaime/Pro/Carb)  Breakfast:  200/27/10   Meal replacement  Snack:  Lunch: 200/27/10   Meal replacement  Snack:100/10/-    low carb snack  Dinner: 400/35/10 ( lean protein, non starchy veg)  Snack: 160/15/13  Bar     Nutrition Education:    Calorie controlled menu  Lean protein food choices  Healthy snack options  Food journaling tips        Nutrition Counseling:  Strategies: meal planning, portion sizes, healthy snack choices, hydration, fiber intake, protein intake, exercise, food journal        Monitoring and Evaluation:  Evaluation criteria:  Energy Intake  Meet protein needs  Maintain adequate hydration  Monitor weekly weight  Meal planning/preparation  Food journal   Decreased portions at mealtimes and snacks  Physical activity      Barriers to learning:none  Readiness to change: Action:  (Changing behavior)  Comprehension: good  Expected Compliance: goodor snack

## 2025-01-24 ENCOUNTER — APPOINTMENT (OUTPATIENT)
Dept: LAB | Facility: CLINIC | Age: 55
End: 2025-01-24
Payer: COMMERCIAL

## 2025-01-24 DIAGNOSIS — E55.9 AVITAMINOSIS D: ICD-10-CM

## 2025-01-24 DIAGNOSIS — E78.5 HYPERLIPIDEMIA, UNSPECIFIED HYPERLIPIDEMIA TYPE: ICD-10-CM

## 2025-01-24 DIAGNOSIS — Z02.1 PRE-EMPLOYMENT HEALTH SCREENING EXAMINATION: ICD-10-CM

## 2025-01-24 DIAGNOSIS — E24.9 CUSHING'S SYNDROME (HCC): ICD-10-CM

## 2025-01-24 LAB — RUBV IGG SERPL IA-ACNC: 51.4 IU/ML

## 2025-01-24 PROCEDURE — 36415 COLL VENOUS BLD VENIPUNCTURE: CPT

## 2025-01-24 PROCEDURE — 86480 TB TEST CELL IMMUN MEASURE: CPT

## 2025-01-24 PROCEDURE — 86787 VARICELLA-ZOSTER ANTIBODY: CPT

## 2025-01-24 PROCEDURE — 86706 HEP B SURFACE ANTIBODY: CPT

## 2025-01-24 PROCEDURE — 86762 RUBELLA ANTIBODY: CPT

## 2025-01-24 PROCEDURE — 86765 RUBEOLA ANTIBODY: CPT

## 2025-01-25 LAB
GAMMA INTERFERON BACKGROUND BLD IA-ACNC: 0.05 IU/ML
HBV SURFACE AB SER-ACNC: 361 MIU/ML
M TB IFN-G BLD-IMP: NEGATIVE
M TB IFN-G CD4+ BCKGRND COR BLD-ACNC: 0.01 IU/ML
M TB IFN-G CD4+ BCKGRND COR BLD-ACNC: 0.01 IU/ML
MITOGEN IGNF BCKGRD COR BLD-ACNC: 9.95 IU/ML
VZV IGG SER QL IA: NORMAL

## 2025-01-29 LAB
MEV IGG SER IA-ACNC: 258 AU/ML
MEV IGM TITR SER IF: ABNORMAL TITER

## 2025-07-10 ENCOUNTER — APPOINTMENT (OUTPATIENT)
Dept: LAB | Facility: CLINIC | Age: 55
End: 2025-07-10